# Patient Record
Sex: FEMALE | Race: AMERICAN INDIAN OR ALASKA NATIVE
[De-identification: names, ages, dates, MRNs, and addresses within clinical notes are randomized per-mention and may not be internally consistent; named-entity substitution may affect disease eponyms.]

---

## 2018-03-09 ENCOUNTER — HOSPITAL ENCOUNTER (EMERGENCY)
Dept: HOSPITAL 5 - ED | Age: 43
Discharge: HOME | End: 2018-03-09
Payer: COMMERCIAL

## 2018-03-09 VITALS — DIASTOLIC BLOOD PRESSURE: 96 MMHG | SYSTOLIC BLOOD PRESSURE: 148 MMHG

## 2018-03-09 DIAGNOSIS — E11.9: ICD-10-CM

## 2018-03-09 DIAGNOSIS — G43.909: ICD-10-CM

## 2018-03-09 DIAGNOSIS — M25.561: Primary | ICD-10-CM

## 2018-03-09 DIAGNOSIS — J45.909: ICD-10-CM

## 2018-03-09 DIAGNOSIS — Z88.0: ICD-10-CM

## 2018-03-09 PROCEDURE — 99283 EMERGENCY DEPT VISIT LOW MDM: CPT

## 2018-03-09 NOTE — XRAY REPORT
RIGHT KNEE RADIOGRAPHS



INDICATION: Swelling, pain behind knee.



COMPARISON: None similar.



FINDINGS: AP, lateral and oblique right knee radiographs demonstrate 

intact bony articulation and appearance. Normal soft tissues without 

evidence of suprapatellar effusion.



CONCLUSION: Normal right knee radiographs.



Thank you for the opportunity to participate in this patient's care.

## 2018-03-09 NOTE — EMERGENCY DEPARTMENT REPORT
HPI





- General


Chief Complaint: Extremity Problem,Nontraumatic


Time Seen by Provider: 03/09/18 11:47





- HPI


HPI: 





Patient is a 42-year-old female who presents to ED complaining of right knee 

pain for the past 4 days.  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 

of walking patient states she does not recall any trauma injuries to the leg.  

Patient states yesterday she felt to bump on the back of her knee causing her 

pain.  Patient states that pain is worsened with prolonged walking and standing.


She denies fever/chills/headache/blurred vision or any other problems





ED Past Medical Hx





- Past Medical History


Hx Diabetes: Yes ([place])


Hx Headaches / Migraines: Yes (past hx)


Hx Asthma: Yes (patient)


Hx COPD: No





- Surgical History


Additional Surgical History: cervical fusion, d&c





- Social History


Smoking Status: Never Smoker





- Medications


Home Medications: 


 Home Medications











 Medication  Instructions  Recorded  Confirmed  Last Taken  Type


 


Ibuprofen [Motrin 600 MG tab] 800 mg PO Q6H PRN #30 tablet 11/18/16 12/05/16 

Unknown Rx


 


oxyCODONE /ACETAMINOPHEN [Percocet 1 - 2 tab PO Q4H PRN #30 tablet 11/18/16 12/ 05/16 Unknown Rx





5/325 mg]     


 


Cyclobenzaprine [Flexeril] 10 mg PO QHS PRN #20 tablet 03/09/18  Unknown Rx


 


Ibuprofen [Motrin] 800 mg PO Q8HR PRN #30 tablet 03/09/18  Unknown Rx














ED Review of Systems


ROS: 


Stated complaint: KNEE PAIN


Other details as noted in HPI





Constitutional: denies: chills, fever


Eyes: denies: eye pain, eye discharge, vision change


ENT: denies: ear pain, throat pain


Respiratory: denies: cough, shortness of breath, wheezing


Cardiovascular: denies: chest pain, palpitations


Endocrine: no symptoms reported


Gastrointestinal: denies: abdominal pain, nausea, diarrhea


Genitourinary: denies: urgency, dysuria, discharge


Musculoskeletal: denies: back pain, joint swelling, arthralgia


Skin: denies: rash, lesions


Neurological: denies: headache, weakness, paresthesias


Psychiatric: denies: anxiety, depression


Hematological/Lymphatic: denies: easy bleeding, easy bruising





Physical Exam





- Physical Exam


Vital Signs: 


 Vital Signs











  03/09/18





  10:01


 


Temperature 98.5 F


 


Pulse Rate 75


 


Respiratory 16





Rate 


 


Blood Pressure 148/96


 


O2 Sat by Pulse 100





Oximetry 











Physical Exam: 





GENERAL: Alert and oriented x3, no apparent distress, Normal Gait, atraumatic.


HEAD: Head is normocephalic and a-traumatic.





LUNGS: Symetrical with respiration, No wheezing, no rales or crackles, CTAB.


HEART:  S1, S2 present, regular rate and rhythm without murmur, no rubs, no 

gallops. Non tender to palpation





BACK: Full range of motion, no spinal tenderness, nontender to palpation.





EXTREMITIES/MUSCULOSKELETAL: No cyanosis, clubbing, rash, lesions or edema. 

Full ROM bilaterally. UEPulses 2+ bilaterally.  LE  5+ strength bilaterally, no 

calf tenderness, no knee joint effusion or erythema or swelling.  Mild 

tenderness to palpation on the back of the knee.  Patient able to flex and 

extend her knee joints with no problems


NEUROLOGIC:  The patient is cooperative with no focal neurologic deficits. 

Normal speech.  Normal sensation in bilateral upper and lower extremities,  No 

loss of sensation











ED Course


 Vital Signs











  03/09/18





  10:01


 


Temperature 98.5 F


 


Pulse Rate 75


 


Respiratory 16





Rate 


 


Blood Pressure 148/96


 


O2 Sat by Pulse 100





Oximetry 














ED Medical Decision Making





- Radiology Data


Radiology results: report reviewed, image reviewed





Ordering Physician: BRENDA GARCIA 


Date of Service: 03/09/18 


Procedure(s): XR knee 3V RT 


Accession Number(s): [id number] 





cc: BRENDA GARCIA 





Fluoro Time In Minutes: 





RIGHT KNEE RADIOGRAPHS 





INDICATION: Swelling, pain behind knee. 





COMPARISON: None similar. 





FINDINGS: AP, lateral and oblique right knee radiographs demonstrate 


intact bony articulation and appearance. Normal soft tissues without 


evidence of suprapatellar effusion. 





CONCLUSION: Normal right knee radiographs. 





Thank you for the opportunity to participate in this patient's care. 





Transcribed By: RS 


Dictated By: KRAIG MASTERS MD 


Electronically Authenticated By: KRAIG MASTERS MD 


Signed Date/Time: 03/09/18 1227 








- Medical Decision Making





42-year-old female presents to ED with right knee pain


ED course: Patient received xray in ED.


Vital signs are normal patient is in no acute distress


Discussed with patient follow-up with primary care physician.  


Discussed the patient and take medications as prescribed. 


Patient has no neurological deficit.  Patient is alert and oriented 3  and 

understands all instructions given.


 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


Critical care attestation.: 


If time is entered above; I have spent that time in minutes in the direct care 

of this critically ill patient, excluding procedure time.








ED Disposition


Clinical Impression: 


Right knee pain


Qualifiers:


 Chronicity: acute Qualified Code(s): M25.561 - Pain in right knee





Disposition: DC-01 TO HOME OR SELFCARE


Is pt being admited?: No


Does the pt Need Aspirin: No


Condition: Stable


Instructions:  Arthralgia (ED), Knee Pain (ED), Knee Exercises (GEN), Knee 

Bursitis (ED)


Additional Instructions: 


Make sure to follow up with the primary care physician as discussed.


Take all your medications as you've been prescribed.


If pain persists follow up with her orthopedic doctor as you've been referred


If you have any worsening symptoms or develop new symptoms please return to ED 

immediately.


Prescriptions: 


Cyclobenzaprine [Flexeril] 10 mg PO QHS PRN #20 tablet


 PRN Reason: Muscle Spasm


Ibuprofen [Motrin] 800 mg PO Q8HR PRN #30 tablet


 PRN Reason: Pain


Referrals: 


PRIMARY CARE,MD [Primary Care Provider] - 3-5 Days


[facility] [Outside] - 3-5 Days


[facility] [Outside] - 3-5 Days


[facility] [Outside] - 3-5 Days


LUZ MARIA AC MD [Staff Physician] - 3-5 Days


Forms:  Accompanied Note, Work/School Release Form(ED)


Time of Disposition: 13:06